# Patient Record
Sex: MALE | Race: OTHER | Employment: UNEMPLOYED | URBAN - METROPOLITAN AREA
[De-identification: names, ages, dates, MRNs, and addresses within clinical notes are randomized per-mention and may not be internally consistent; named-entity substitution may affect disease eponyms.]

---

## 2024-04-01 ENCOUNTER — HOSPITAL ENCOUNTER (EMERGENCY)
Facility: HOSPITAL | Age: 64
Discharge: HOME | End: 2024-04-01
Attending: EMERGENCY MEDICINE

## 2024-04-01 ENCOUNTER — APPOINTMENT (OUTPATIENT)
Dept: CARDIOLOGY | Facility: HOSPITAL | Age: 64
End: 2024-04-01

## 2024-04-01 VITALS
DIASTOLIC BLOOD PRESSURE: 112 MMHG | RESPIRATION RATE: 16 BRPM | TEMPERATURE: 97.6 F | WEIGHT: 190 LBS | HEIGHT: 67 IN | SYSTOLIC BLOOD PRESSURE: 218 MMHG | BODY MASS INDEX: 29.82 KG/M2 | HEART RATE: 63 BPM | OXYGEN SATURATION: 98 %

## 2024-04-01 DIAGNOSIS — I10 HYPERTENSION, UNSPECIFIED TYPE: Primary | ICD-10-CM

## 2024-04-01 LAB
ATRIAL RATE: 61 BPM
P AXIS: 50 DEGREES
P OFFSET: 186 MS
P ONSET: 136 MS
PR INTERVAL: 180 MS
Q ONSET: 226 MS
QRS COUNT: 10 BEATS
QRS DURATION: 74 MS
QT INTERVAL: 398 MS
QTC CALCULATION(BAZETT): 400 MS
QTC FREDERICIA: 399 MS
R AXIS: -21 DEGREES
T AXIS: 42 DEGREES
T OFFSET: 425 MS
VENTRICULAR RATE: 61 BPM

## 2024-04-01 PROCEDURE — 93005 ELECTROCARDIOGRAM TRACING: CPT

## 2024-04-01 PROCEDURE — 99283 EMERGENCY DEPT VISIT LOW MDM: CPT | Mod: 25

## 2024-04-01 ASSESSMENT — COLUMBIA-SUICIDE SEVERITY RATING SCALE - C-SSRS
1. IN THE PAST MONTH, HAVE YOU WISHED YOU WERE DEAD OR WISHED YOU COULD GO TO SLEEP AND NOT WAKE UP?: NO
6. HAVE YOU EVER DONE ANYTHING, STARTED TO DO ANYTHING, OR PREPARED TO DO ANYTHING TO END YOUR LIFE?: NO
2. HAVE YOU ACTUALLY HAD ANY THOUGHTS OF KILLING YOURSELF?: NO

## 2024-04-01 ASSESSMENT — PAIN - FUNCTIONAL ASSESSMENT: PAIN_FUNCTIONAL_ASSESSMENT: 0-10

## 2024-04-01 ASSESSMENT — PAIN SCALES - GENERAL: PAINLEVEL_OUTOF10: 0 - NO PAIN

## 2024-04-01 NOTE — Clinical Note
Discharged instruction given to pt, understood what symptoms to look at for. He will follow up when he gets home. New set of vitals

## 2024-04-01 NOTE — ED TRIAGE NOTES
Pt arrived to triage for HTN. Pt denies any hx and does not take mediation. Pt is from [place] and supposed to fly back tomorrow. Pt was here visiting family. Pt denies any complaints at this time. EKG/IV/Labs complete. No obvious signs of distress at time of triage. EKG complete in triage.

## 2024-04-01 NOTE — ED PROVIDER NOTES
Limitations to History: None  Additional History Obtained from: Family    HPI:    Patient is presenting to the emergency department after being referred by urgent care due to elevated blood pressure.  He was there for left ear discomfort and found to be hypertensive and referred to the emergency department for evaluation.  Patient is denying any chest pain, difficulty breathing, headaches, changes in vision or hearing, changes in urination or lower extremity swelling.  He states he has been told that he has had elevated blood pressures at doctors visits previously but was not started on any medications.  Has a family history of cardiac disease and an aunt as well as diabetes but denies any other significant family history.  Has had EKGs previously but no other cardiac testing.  His review of systems is otherwise negative.    ------------------------------------------------------------------------------------------------------------------------------------------  Physical Exam:    ED Triage Vitals [04/01/24 1032]   Temperature Heart Rate Respirations BP   36.4 °C (97.6 °F) 63 16 (!) 229/122      Pulse Ox Temp src Heart Rate Source Patient Position   99 % -- -- Sitting      BP Location FiO2 (%)     Right arm --        VS: As documented in the triage note and EMR flowsheet from this visit were reviewed.  General: Well appearing. No acute distress.   Eyes: Pupils round and reactive. No scleral icterus. No conjunctival injection  HENT: Atraumatic. Normocephalic. Moist mucous membranes. Trachea midline  CV: RRR, No MRG. No pedal edema appreciated.  Resp: Clear to auscultation bilaterally. Non-labored.    GI: Soft, nontender to palpation. Nondistended. No guarding, rigidity or rebound  Skin: Warm, dry, intact. No systemic rashes or lesions appreciated.  Extremities: No deformities or pain out of proportion; pulses intact   Neuro: Alert. No focal motor or sensory deficits observed. Speech fluent. Answers questions  appropriately.   Psych: Appropriate. [place]empt.    ------------------------------------------------------------------------------------------------------------------------------------------    Medical Decision Making  Patient is presenting to the emergency department after being referred by urgent care for hypertension.  Patient has elevated blood pressures but is asymptomatic with this and has had a history of elevated blood pressures previously.  EKG was reviewed and interpreted independently by me showing normal sinus rhythm at 61 bpm, normal intervals, no ST elevations, depressions or T wave inversions.  As the patient is asymptomatic with his high blood pressure and has a family doctor that he can see later on this week, will discharge the patient home with outpatient follow-up.  He was given strict return precautions for any development of any symptoms as above.  Family expressed understanding.  They were in agreement the plan of care for outpatient follow-up.      Objective Data  I have independently interpreted the following labs, imaging studies and MDM added to ED Course  Labs Reviewed - No data to display    No orders to display       ED Course  Diagnoses as of 04/01/24 1102   Hypertension, unspecified type       Procedure  Procedures    Disposition: discharged    Heidi Hodges DO  Emergency Medicine  Medical Toxicology     Heidi Hodges DO  04/01/24 1102